# Patient Record
(demographics unavailable — no encounter records)

---

## 2017-07-07 NOTE — RADIOLOGY REPORT (SQ)
EXAM DESCRIPTION:  CHEST SINGLE VIEW



COMPLETED DATE/TIME:  7/7/2017 8:11 pm



REASON FOR STUDY:  sob



COMPARISON:  None.



EXAM PARAMETERS:  NUMBER OF VIEWS: One view.

TECHNIQUE: Single frontal radiographic view of the chest acquired.

RADIATION DOSE: NA

LIMITATIONS: None.



FINDINGS:  LUNGS AND PLEURA: Patchy airspace disease in the right lung base.  No significant effusion
.  No pneumothorax.  Mild interstitial thickening bilaterally.

MEDIASTINUM AND HILAR STRUCTURES: Age-appropriate.

HEART AND VASCULAR STRUCTURES: Mild cardiac enlargement.

BONES: No acute findings.

HARDWARE: None in the chest.

OTHER: No other significant finding.



IMPRESSION:  Patchy airspace disease in the right lung base.  No significant effusion.  Mild intersti
tial thickening bilaterally.



TECHNICAL DOCUMENTATION:  JOB ID:  [id number]

## 2017-07-07 NOTE — ER DOCUMENT REPORT
ED Medical Screen (RME)





- General


Chief Complaint: Shortness Of Breath


Stated Complaint: DIFFICULTY BREATHING


Time Seen by Provider: 07/07/17 19:20


Mode of Arrival: Ambulatory


Information source: Patient


Notes: 


72-year-old man with a history of chronic kidney disease who presents to the 

emergency room with increasing shortness of breath, dyspnea on exertion and 

increasing lower extremity swelling over the last few days.


TRAVEL OUTSIDE OF THE [place] IN LAST 30 DAYS: No





- Related Data


Allergies/Adverse Reactions: 


 





No Known Allergies Allergy (Verified 07/07/17 18:57)


 











Past Medical History





- Social History


Chew tobacco use (# tins/day): No


Frequency of alcohol use: None


Drug Abuse: None





- Past Medical History


Cardiac Medical History: Reports: Hx Hypertension


Renal/ Medical History: Denies: Hx Peritoneal Dialysis


Past Surgical History: Reports: Hx Orthopedic Surgery - lt knee replacement, Hx 

Tonsillectomy





- Immunizations


Hx Diphtheria, Pertussis, Tetanus Vaccination: Yes





Physical Exam





- Vital signs


Vitals: 





 











Temp Pulse Resp BP Pulse Ox


 


 98.2 F   75   20   162/82 H  96 


 


 07/07/17 18:57  07/07/17 18:57  07/07/17 18:57  07/07/17 18:57  07/07/17 18:57














Course





- Vital Signs


Vital signs: 





 











Temp Pulse Resp BP Pulse Ox


 


 98.2 F   75   20   162/82 H  96 


 


 07/07/17 18:57  07/07/17 18:57  07/07/17 18:57  07/07/17 18:57  07/07/17 18:57

## 2017-07-07 NOTE — PDOC H&P
History of Present Illness


Admission Date/PCP: 


  07/07/17 22:11





  CARLOS JACOB, 





Patient complains of: Shortness of breath and leg swelling


History of Present Illness: 


SULMA MURPHY is a 72 year old male with a past medical history of hypertension, 

gout and chronic kidney disease who had been in his usual state of health until 

approximately 4 days prior to presentation with shortness of breath with 

exertion and orthopnea.  He denies recent chest, back or abdominal pain, 

palpitations nausea or vomiting.  He also denies recent change in medications 

and or diet.  In the emergency room he is found to have volume overload with a 

second-degree heart block with bradycardia.  He started on Lasix and referred 

to the hospitalist for admission.  Patient denies recent viral prodrome and is 

otherwise felt well.








Past Medical History


Cardiac Medical History: Reports: Hypertension


Renal/ Medical History: Reports: Chronic Kidney Disease


Musculoskeltal Medical History: Reports: Gout





Past Surgical History


Past Surgical History: Reports: Orthopedic Surgery - lt knee replacement, 

Tonsillectomy





Social History


Information Source: Patient


Lives with: Spouse/Significant other


Smoking Status: Never Smoker


Frequency of Alcohol Use: None


Drugs: None





- Advance Directive


Resuscitation Status: Full Code





Family History


Family History: None


Parental Family History Reviewed: Yes


Children Family History Reviewed: Yes


Sibling(s) Family History Reviewed.: Yes





Medication/Allergy


Home Medications: 








Allopurinol [Zyloprim 300 Mg Tablet] 300 mg PO DAILY 11/25/12 


Bisoprolol Fumarate/Hctz [Ziac 5-6.25 Mg Tablet] 1 each PO DAILY 11/25/12 


Nifedipine [Adalat CC 60 mg Tablet] 60 mg PO DAILY 11/25/12 








Allergies/Adverse Reactions: 


 





No Known Allergies Allergy (Verified 07/07/17 18:57)


 











Review of Systems


Constitutional: ABSENT: chills, fever(s), headache(s), weight gain, weight loss


Eyes: ABSENT: visual disturbances


Ears: ABSENT: hearing changes


Cardiovascular: PRESENT: dyspnea on exertion, edema, orthropnea.  ABSENT: chest 

pain, palpitations


Respiratory: ABSENT: cough, hemoptysis


Gastrointestinal: ABSENT: abdominal pain, constipation, diarrhea, hematemesis, 

hematochezia, nausea, vomiting


Genitourinary: ABSENT: dysuria, hematuria


Musculoskeletal: ABSENT: joint swelling


Integumentary: ABSENT: rash, wounds


Neurological: ABSENT: abnormal gait, abnormal speech, confusion, dizziness, 

focal weakness, syncope


Psychiatric: ABSENT: anxiety, depression, homidical ideation, suicidal ideation


Endocrine: ABSENT: cold intolerance, heat intolerance, polydipsia, polyuria


Hematologic/Lymphatic: ABSENT: easy bleeding, easy bruising





Physical Exam


Vital Signs: 


 











Temp Pulse Resp BP Pulse Ox


 


 98.2 F   75   25 H  173/63 H  96 


 


 07/07/17 18:57  07/07/17 18:57  07/07/17 22:42  07/07/17 22:42  07/07/17 22:42











General appearance: PRESENT: no acute distress, cooperative, well-developed, 

well-nourished


Head exam: PRESENT: atraumatic, normocephalic


Eye exam: PRESENT: conjunctiva pink, EOMI, PERRLA.  ABSENT: scleral icterus


Ear exam: PRESENT: normal external ear exam


Mouth exam: PRESENT: moist, tongue midline


Neck exam: ABSENT: carotid bruit, JVD, lymphadenopathy, thyromegaly


Respiratory exam: PRESENT: crackles, decreased breath sounds, tachypnea.  ABSENT

: rales, rhonchi, wheezes


Cardiovascular exam: PRESENT: bradycardia, irregular rhythm, +S1, +S2.  ABSENT: 

diastolic murmur, rubs, systolic murmur


Pulses: PRESENT: normal dorsalis pedis pul


Vascular exam: PRESENT: normal capillary refill


GI/Abdominal exam: PRESENT: normal bowel sounds, soft.  ABSENT: distended, 

guarding, mass, organolmegaly, rebound, tenderness


Rectal exam: PRESENT: deferred


Extremities exam: PRESENT: full ROM, pedal edema, +2 edema.  ABSENT: calf 

tenderness, clubbing, joint swelling, tenderness


Neurological exam: PRESENT: alert, awake, oriented to person, oriented to place

, oriented to time, oriented to situation, CN II-XII grossly intact.  ABSENT: 

motor sensory deficit


Psychiatric exam: PRESENT: appropriate affect, normal mood.  ABSENT: homicidal 

ideation, suicidal ideation


Skin exam: PRESENT: dry, intact, warm.  ABSENT: cyanosis, rash





Results


Impressions: 


 





Chest X-Ray  07/07/17 19:20


IMPRESSION:  Patchy airspace disease in the right lung base.  No significant 

effusion.  Mild interstitial thickening bilaterally.


 














Assessment & Plan





- Diagnosis


(1) Congestive heart failure


Qualifiers: 


     Congestive heart failure type: unspecified congestive heart failure type


Is this a current diagnosis for this admission?: YesPlan: 


Secondary to dysrhythmia resulting in volume overload, transcutaneous pacemaker 

placed, cardiology consulted, beta-blocker reduced, transdermal nitro and Lasix 

initiated.  2D echo and fluid restriction ordered








(2) Chronic kidney disease





Is this a current diagnosis for this admission?: YesPlan: 


Chronic kidney disease, avoiding nephrotoxic meds and doses holding 

allopurinol.  Reevaluate chemistry








(3) Hypertension





Is this a current diagnosis for this admission?: YesPlan: 


Optimize ACE inhibitor, nitroglycerin and as needed hydralazine.  Norvasc held 

for edema








(4) Second degree AV block, Mobitz type I


Is this a current diagnosis for this admission?: YesPlan: 


Transcutaneous pacing placed not yet indicated, cardiology consult.











- Time


Time Spent: 50 to 70 Minutes





- Inpatient Certification


Medical Necessity: Need Close Monitoring Due to Risk of Patient Decompensation

## 2017-07-08 NOTE — EKG REPORT
SEVERITY:- ABNORMAL ECG -

SINUS BRADYCARDIA

MOBITZ I AV BLOCK (WENCKEBACH)

LEFT ANTERIOR FASCICULAR BLOCK

BORDERLINE T WAVE ABNORMALITIES

:

Confirmed by: Jose Angel Hallman MD 08-Jul-2017 09:13:07

## 2017-07-08 NOTE — EKG REPORT
SEVERITY:- ABNORMAL ECG -

SINUS RHYTHM

NONCONDUCTED PAC

FIRST DEGREE AV BLOCK

LEFT ANTERIOR FASCICULAR BLOCK

:

Confirmed by: Jose Angel Hallman MD 08-Jul-2017 16:37:59

## 2017-07-08 NOTE — PDOC PROGRESS REPORT
Subjective


Progress Note for:: 07/08/17


Subjective:: 


Patient continues to have a heart rate in the 30s and 40s.


Patient denies chest pain, shortness of breath, nausea, vomiting, fever, chills.





Physical Exam


Vital Signs: 


 











Temp Pulse Resp BP Pulse Ox


 


 98.0 F   61   20   141/71 H  95 


 


 07/08/17 03:53  07/08/17 03:53  07/08/17 03:53  07/08/17 03:53  07/08/17 03:53








 Intake & Output











 07/07/17 07/08/17 07/09/17





 06:59 06:59 06:59


 


Intake Total  200 


 


Output Total  1775 


 


Balance  -1575 


 


Weight  84.8 kg 











Exam: 


GENERAL: A+Ox3, NAD


HEENT: Conjunctiva clear, nonicteric, moist mucous membranes, no JVD, midline 

trachea


RESPIRATORY: CTAB


CARDIAC: Bradycardic, regularly irregular, +2/6 SM LLSB, no gallops/rubs


ABDOMEN: Soft, NTTP, ND, +BSx4


EXTREMETIES: No cyanosis, clubbing; 2+ bilateral lower extremity edema


NEUROLOGIC: Alert, oriented to person/place/time, CN's grossly intact, no focal 

deficits


SKIN: No rash, wounds


PSYCH: Normal mood, normal affect





Results


Laboratory Results: 


 











  07/08/17 07/08/17





  01:27 01:27


 


Creatine Kinase  69 


 


CK-MB (CK-2)   1.08


 


Troponin I   0.016











Impressions: 


 





Chest X-Ray  07/07/17 19:20


IMPRESSION:  Patchy airspace disease in the right lung base.  No significant 

effusion.  Mild interstitial thickening bilaterally.


 














Assessment & Plan





- Diagnosis


(1) Second degree AV block, Mobitz type I


Is this a current diagnosis for this admission?: YesPlan: 


Patient currently with second-degree Mobitz block.  Stop patient's Coreg and 

Norvasc.  Continue to monitor.  Consider dopamine for hypotension and 

dobutamine for congestive heart failure.  Appreciate cardiology input.  

Continue to monitor patient on telemetry for fear of worsening to third-degree 

heart block.








(2) Congestive heart failure


Qualifiers: 


     Congestive heart failure type: unspecified congestive heart failure type  

   Congestive heart failure chronicity: acute        Qualified Code(s): I50.9 - 

Heart failure, unspecified  


Is this a current diagnosis for this admission?: YesPlan: 


Patient with acute congestive heart failure.  Continue diuresis.


Unable to tolerate beta-blocker secondary to bradycardia.


Lisinopril 40 mg p.o. daily.


Echo currently pending.


Consider dobutamine if patient persists with bradycardia and heart failure.








(3) Chronic kidney disease


Qualifiers: 


     Chronic kidney disease stage: stage 2 (mild)        Qualified Code(s): 

N18.2 - Chronic kidney disease, stage 2 (mild)  


Is this a current diagnosis for this admission?: YesPlan: 


Adjust medications








(4) Hypertension


Qualifiers: 


     Hypertension type: unspecified        Qualified Code(s): I10 - Essential (

primary) hypertension  


Is this a current diagnosis for this admission?: YesPlan: 


Currently controlled on lisinopril and will consider as needed hydralazine.











- Time


Time Spent with patient: 15-24 minutes


Medications reviewed and adjusted accordingly: Yes


Within: Other - Improvement patient's symptomatology

## 2017-07-08 NOTE — CONSULTATION REPORT E
Consultation Report



NAME: SULMA MURPHY

MRN:  [id number]               : 1944      AGE: 72Y

DATE: 2017     335  A



TO:   ELLIS DELEON M.D.



FROM: LIVIER SHORT M.D.

      Requesting Physician



REASON FOR CONSULTATION:

Bradycardia and second degree AV block.



HISTORY OF PRESENT ILLNESS:

The patient is a 72-year-old  male with past medical history of

hypertension and chronic kidney disease who states that since the past 4

days prior to admission he has been having progressively increasing

shortness of breath with less shortness of breath with PND, orthopnea, and

leg edema.  He also had some chest tightness which is now relieved.  Also

the patient's PND and leg edema is much improved now with treatment.  The

patient was seen in the emergency room and the ER doctor called me saying

that the patient was in complete heart block but on review of the EKG by

me on the computer in [facility], although he had episodes of 2:1 second

degree AV block, it seemed that there were episodes where the patient had

second degree AV block type I Wenckebach.  Also when he had 2:1 AV block,

the QRS complexes were narrow and in view of the patient's other rhythm

showing that the patient was in Wenckebach type I second degree AV block,

it was thought that this 2:1 AV block was also secondary to Wenckebach. 

The patient denies any dizziness or syncope or near syncope.  There are no

TIA or CVA symptoms.  At present the patient has no shortness of breath at

rest and has no PND but does have some orthopnea.  The patient's states

with diuretics he had good diuresis.  The patient's blood pressure has

remained stable even when the patient had second degree AV block.  After I

gave the patient atropine 0.6 mg IV push times 1, the patient's EKG seems

to be showing sinus rhythm with first degree AV block and occasionally

blocked APCs.



PAST MEDICAL HISTORY:

Positive for history of hypertension and history of chronic kidney

disease.  He also has history of glaucoma.  The patient for his blood

pressure has been on Coreg, amlodipine, timolol for his eyedrops, and also

lisinopril.  The patient's Coreg has been discontinued.  He has a history

of diabetes mellitus type 2, non-insulin dependent.  He used to be on

metformin which recently has been discontinued.  He has a history of

chronic kidney disease.  At present seems to be stage II with a GFR of 59

mL/min.  He also has hypokalemia which has been corrected.  He has no

history of TIA or CVA.  No history of MI.  Patient denies past history of

congestive heart failure, only recent leg edema, PND, orthopnea.  The

patient denies any palpitations, dizziness, near syncope, or syncope. 

There is no history of coronary heart disease.  No history of rheumatic

fever.  No history of MI or anginal symptoms.  No history of asthma or

COPD.  No history of sleep apnea.  No history of pulmonary embolism.  No

recent cough or sputum production.  No symptoms of urinary tract

infection.



REVIEW OF SYSTEMS:

CONSTITUTIONAL:  Denies any fever, chills, or rigors.  Patient has some

mild generalized fatigue.



HEAD:  Denies any dizziness or head injury.



EYES:  No history of amblyopia or diplopia.  No history of amaurosis

fugax.



EARS:  No history of hearing loss.  No history of tinnitus.  No history of

vertigo.



NOSE:  No history of deviated nasal septum.  No bleeding from the nose. 

No history of hay fever.  No history of nasal polyposis.



MOUTH:  No history of altered taste sensation.  No history of ulcers in

the mouth.  No history of bleeding from the gums.



THROAT:  No history of odynophagia or dysphagia.  No history of recurrent

sore throats.



SKIN:  No pruritus.  No yellowish discoloration of the skin.  No history

of psoriasis.  No history of skin cancer.



NECK:  No history of painless or painful swelling in the neck.  No neck

pain.  No goiter.



LUNGS:  No history of recent cough or sputum production.  No wheezing.  No

history of asthma or COPD.  No history of sleep apnea.  No history of

pulmonary embolism.  No history of pleuritic chest pain.  No history of

hemoptysis.  No symptoms suggestive of upper or lower respiratory tract

infection of pneumonia.



CARDIAC:  History of hypertension.  At present patient on Coreg.  Doubt

second degree AV block with stable blood pressure and without any symptoms

of dizziness or syncope or near syncope.  His Coreg has been stopped and

now the patient is in sinus rhythm with first degree AV block with

occasional blocked premature atrial complexes.  The patient has no history

of congestive heart failure although recently the patient's symptoms

suggestive of biventricular failure with leg edema, PND, orthopnea.  No

history of MI.  No prior history of anginal symptoms but the patient

states that recently he has been having chest tightness.  This may be due

to the patient being in volume overload congestive heart failure.  It is

not known what the patient's systolic function is and if at all the

patient has diastolic dysfunction, we will need an echo.  There is no

history of dizziness or near syncope or syncope.  History of hypertension

well controlled.  No prior history of MI or anginal symptoms.



MUSCULOSKELETAL:  Denies arthritis or collagen vascular disease.



RENAL:  History of chronic kidney disease.  At present GFR is 59, hence

the patient is stage II.  On admission the patient was stage III.  No

symptoms of UTI.  No symptoms of an enlarged prostate.  No history of

hematuria, pyuria, or dysuria.



GASTROINTESTINAL:  No history of GERD.  No history of peptic ulcer

disease.  No history of GI bleed.  No history of fatty food intolerance. 

No history of abdominal pain.  No history of jaundice.  No history of

cirrhosis.  No history of altered bowel movements.



CENTRAL NERVOUS SYSTEM:  No history of TIA or CVA.  No history of

seizures, headaches, or migraines.  No history gait imbalance.  No history

of sleep apnea.



PSYCHIATRIC:  No history of anxiety or depression.  No suicidal or

homicidal ideation.



VASCULAR:  No history of calf or buttock claudication.  No history of DVT.



HEMATOLOGICAL:  No history of bleeding diathesis.  No history of clotting

disorders.



ALLERGIES:

The patient has no known allergies.



SOCIAL HISTORY:

The patient does not smoke.  There is no history of ETOH abuse.



DISPOSITION:

The patient is a FULL [employer].  His wife is the surrogate healthcare decision

maker.



MEDICATIONS:

1.  Aspirin 81 mg p.o. daily.

2.  Note that the patient had one dose of atropine 0.6 mg times 1.

3.  Colace 100 mg p.o. daily.

4.  He is on Vasotec 10 mg p.o. q.12 hours.

5.  He did get Lasix 40 mg IV push times 1 yesterday and he is on 40 mg IV

daily.

6.  He is on heparin 5000 units subcutaneously q.8 hours for DVT/PE

prophylaxis.

7.  He is on nitroglycerin, Nitro-Dur transdermal times 1 which has been

discontinued.

8.  He did get KCL 30 mEq p.o. times 1 for his potassium being low.



PHYSICAL EXAMINATION:

On examination, the patient was seen around 9 a.m.



VITAL SIGNS:  His pulse was 60 beats per minute.  The patient is afebrile

with a temperature of 98.2 degrees Fahrenheit.  His blood pressure is

138/72.  Respirations are 16 per minute.  O2 saturations are 96% on room

air.



HEAD:  Atraumatic, normocephalic.



EYES:  Pupils are equal, round, regular and reactive to light and

accommodation.  Extraocular movements are normal.  There is no

conjunctival pallor.  There is no scleral icterus.



EARS:  Tympanic membranes are intact.  External auditory canals are clear.



NOSE:  There is no deviated nasal septum.  There is no inflammation of the

nasal mucous membranes.



THROAT:  Mucous membranes of the throat are without any exudates or

redness.



SKIN:  There are no skin rashes.  There is no petechia or ecchymosis. 

There are no skin lesions.



NECK:  Supple.  There is mild JVD present.  Carotids are equal.  There is

no bruit.  There is no goiter.  There is no lymphadenopathy.  There are

axillary muscles of respiration in use.  Trachea is central.



LUNGS:  Show a few bibasilar rales of CHF.  There is no chest wall

tenderness.



HEART:  S1 and S2 are heard.  There is no S3 gallop.  There is no S4

gallop.  There is a systolic murmur in the left sternal border and the

apex.  There are no rubs.



ABDOMEN:  Soft, nontender.  There is no hepatosplenomegaly.  Bowel sounds

are well heard.  There are no tender areas or masses.



EXTREMITIES:  Femorals are slightly diminished.  There are no femoral

bruits.  Leg pulses are well felt.  There is trace to mild pedal edema

bilaterally.  There is no cyanosis or clubbing.  There is no DVT or

cellulitis.  There is no calf tenderness.



CENTRAL NERVOUS SYSTEM:  The patient is conscious, awake, alert, oriented

x3 with no focal deficits.



PSYCHIATRIC:  The patient's judgment and insight are intact.  His affect

is normal.



DIAGNOSTICS:

The patient's chest x-ray shows patchy airspace disease in the right lung

base.  No significant effusion.  Mild interstitial thickening bilaterally.

There is mild cardiac enlargement but no definite evidence of congestive

heart failure.



The patient's EKG done yesterday shows a heart rate of 39 beats per minute

which is second degree AV block, Mobitz type I.  Left anterior fascicular

block.  Poor R wave in V1-V3 most likely secondary to left anterior

fascicular block.  The patient's EKG done this morning shows sinus

bradycardia, Mobitz type 1 AV block, left anterior fascicular block,

borderline T wave abnormalities and also blocked APCs.  His EKG subsequent

to giving atropine shows sinus bradycardia with first degree AV block and

occasional blocked APCs.



The patient's sodium is 144.5, potassium 3.4, chloride is 109, CO2 is 21. 

The patient's BUN is 17, creatinine is 1.42, GFR is reduced at 59 mL.  His

glucose is 116 and his calcium is 8.8.  His cardiac enzymes have been

negative x3 including the CPK-MB and the troponin I. his NT-proBNP is

2650.  His triglycerides are 73.  His total cholesterol is 134.56.  His

LDL cholesterol is good at 81.  His HDL cholesterol is low at 36.  His

thyroid function showed a normal TSH of 2.23.  Free T4 is 1.37.  Free T3

is 4.22.  The patient's white count is 9000, hemoglobin is 12.2,

hematocrit is 38.5, and his platelet count is 190,000.



IMPRESSION:

1.  Second degree AV block, Mobitz type I with a stable blood pressure

and asymptomatic.

2.  Volume overload congestive heart failure, most likely secondary to his

renal function deteriorating.  Cannot at present conclude if the patient

has systolic or diastolic or a combination of systolic and diastolic heart

failure.  Will need an echocardiogram.  Note that the patient's Coreg has

been held.  He is on Vasotec.  Continue the same.

3.  Hypertension seems to be fairly well controlled.

4.  Diabetes mellitus type 2, non-insulin dependent.

5.  Dyslipidemia.

6.  Chronic kidney disease.  At present stage II.  On admission it was

stage III.  Continue the patient's Lasix.  Continue his other medications.

Will hold the patient's beta blocker.  We will get an echocardiogram. 

Later would recommend the patient have an IV Lexiscan Cardiolite stress

test.



Note, the patient was seen at 9 a.m.  Forty minutes spent on the patient

with more than 50% of the time spent on direct patient care.  His

medications have been reviewed.  Medications have been adjusted and

stopped.  Medications added.  Note this required a highly complex medical

decision making.  Discussed with other caregiving providers on the case. 

We will follow with you.  Coordination of care done and management plan

formulated for this patient after discussions with the other caregiving

providers on the case.  We will follow up with you.  Thanking you.

 







DICTATING PHYSICIAN: ELLIS DELEON M.D.





[id number]                  DT: 2017    1317

PHY#: [id number]            DD: 2017    1304

ID:   [id number]           JOB#: [id number]      ACCT: [id number]



cc:ELLIS DELEON M.D.

>







MTDD

## 2017-07-08 NOTE — EKG REPORT
SEVERITY:- ABNORMAL ECG -

SINUS BRADYCARDIA

SECOND  DEGREE AV BLOCK  MOBITZ I.

LEFT ANTERIOR FASCICULAR BLOCK

OLD ANTERIOR MI

:

Confirmed by: Jose Angel Hallman MD 08-Jul-2017 09:14:39

## 2017-07-09 NOTE — EKG REPORT
SEVERITY:- ABNORMAL ECG -

PREDOMINANT 2:1 AV BLOCK

MOBITZ I AV BLOCK (WENCKEBACH)

LEFT ANTERIOR FASCICULAR BLOCK

:

Confirmed by: Jose Angel Hallman MD 09-Jul-2017 18:03:06

## 2017-07-09 NOTE — PDOC PROGRESS REPORT
Subjective


Progress Note for:: 07/09/17


Subjective:: 


Patient continues to have a heart rate in the 30s and 40s.


Patient denies chest pain, shortness of breath, nausea, vomiting, fever, chills

, constipation, diarrhea. 





Physical Exam


Vital Signs: 


 











Temp Pulse Resp BP Pulse Ox


 


 98.2 F   62   18   151/79 H  98 


 


 07/09/17 12:16  07/09/17 12:16  07/09/17 12:16  07/09/17 12:16  07/09/17 12:16








 Intake & Output











 07/08/17 07/09/17 07/10/17





 06:59 06:59 06:59


 


Intake Total 200 1038 


 


Output Total 1778 2720 


 


Balance -1575 -1530 


 


Weight 84.8 kg 84.3 kg 











Exam: 


GENERAL: A+Ox3, NAD


HEENT: Conjunctiva clear, nonicteric, moist mucous membranes, no JVD, midline 

trachea


RESPIRATORY: CTAB


CARDIAC: Bradycardic, regularly irregular, +2/6 SM LLSB, no gallops/rubs


ABDOMEN: Soft, NTTP, ND, +BSx4


EXTREMETIES: No cyanosis, clubbing; 1+ bilateral lower extremity edema


NEUROLOGIC: Alert, oriented to person/place/time, CN's grossly intact, no focal 

deficits


SKIN: No rash, wounds


PSYCH: Normal mood, normal affect





Results


Laboratory Results: 


 





 07/09/17 08:10 





 07/09/17 08:10 





 











  07/09/17 07/09/17





  08:10 08:10


 


WBC  9.9 


 


RBC  4.92 


 


Hgb  12.7 L 


 


Hct  40.3 


 


MCV  82 


 


MCH  25.7 L 


 


MCHC  31.4 L 


 


RDW  17.5 H 


 


Plt Count  187 


 


Seg Neutrophils %  67.9 


 


Lymphocytes %  16.4 


 


Monocytes %  12.2 


 


Eosinophils %  2.6 


 


Basophils %  0.9 


 


Absolute Neutrophils  6.7 


 


Absolute Lymphocytes  1.6 


 


Absolute Monocytes  1.2 


 


Absolute Eosinophils  0.3 


 


Absolute Basophils  0.1 


 


Sodium   143.0


 


Potassium   3.7


 


Chloride   108 H


 


Carbon Dioxide   23


 


Anion Gap   12


 


BUN   19


 


Creatinine   1.50 H


 


Est GFR ( Amer)   56 L


 


Est GFR (Non-Af Amer)   46 L


 


Glucose   126 H


 


Calcium   8.7








 











  07/08/17 07/08/17 07/08/17





  01:27 01:27 07:50


 


Creatine Kinase  69   55


 


CK-MB (CK-2)   1.08 


 


Troponin I   0.016 














  07/08/17 07/08/17 07/08/17





  07:50 14:12 14:12


 


Creatine Kinase   52 L 


 


CK-MB (CK-2)  0.72   0.73


 


Troponin I  0.017   0.013











Impressions: 


 





Chest X-Ray  07/07/17 19:20


IMPRESSION:  Patchy airspace disease in the right lung base.  No significant 

effusion.  Mild interstitial thickening bilaterally.


 














Assessment & Plan





- Diagnosis


(1) Second degree AV block, Mobitz type I


Is this a current diagnosis for this admission?: YesPlan: 





Patient currently with second-degree Mobitz block.  Continue to hold patient's 

Coreg and Norvasc.  Have also held patient's combigen and timolol eye ggt. 

Continue to monitor.  Consider dopamine for hypotension and dobutamine for 

congestive heart failure.  Appreciate cardiology input and administration of 

atropine. Continue to monitor patient on telemetry for fear of worsening to 

third-degree heart block.








(2) Congestive heart failure


Qualifiers: 


     Congestive heart failure type: unspecified congestive heart failure type  

   Congestive heart failure chronicity: acute        Qualified Code(s): I50.9 - 

Heart failure, unspecified  


Is this a current diagnosis for this admission?: YesPlan: 





Uncompensated.


Patient with acute congestive heart failure.  Continue diuresis.


Unable to tolerate beta-blocker secondary to bradycardia.


Lisinopril 40 mg p.o. daily.


Echo currently pending.


Consider dobutamine if patient persists with bradycardia and heart failure.








(3) Chronic kidney disease


Qualifiers: 


     Chronic kidney disease stage: stage 2 (mild)        Qualified Code(s): 

N18.2 - Chronic kidney disease, stage 2 (mild)  


Is this a current diagnosis for this admission?: YesPlan: 


Adjust medications








(4) Hypertension


Qualifiers: 


     Hypertension type: unspecified        Qualified Code(s): I10 - Essential (

primary) hypertension  


Is this a current diagnosis for this admission?: YesPlan: 


Currently controlled on lisinopril and will consider as needed hydralazine.











- Time


Time Spent with patient: 25-34 minutes


Medications reviewed and adjusted accordingly: Yes





- Inpatient Certification


Based on my medical assessment, after consideration of the patient's 

comorbidities, presenting symptoms, or acuity I expect that the services needed 

warrant INPATIENT care.: Yes


I certify that my determination is in accordance with my understanding of 

Medicare's requirements for reasonable and necessary INPATIENT services [42 CFR 

412.3e].: Yes


Medical Necessity: Need For Continuous Telemetry Monitoring


Post Hospital Care: D/C Planner Documentation

## 2017-07-09 NOTE — PROGRESS NOTE E
Progress Note



NAME: SULMA MURPHY

MRN: [id number]

: 1944             AGE: 72Y

DATE:  2017                     ROOM: 335



SUBJECTIVE:

The patient denies any chest pain or discomfort.  There is no PND or

orthopnea.  There is no leg edema.  The patient denies any anginal

symptoms.  There is no dizziness, near syncope or syncope.  The patient

continues to be in Mobitz type 1 Wenckebach second degree AV block.  The

patient was given atropine 1 mg IV push and subsequently went to

first-degree AV block with a heart rate of 62 beats per minute.  Even when

the patient was 2:1 block Wenckebach type 1, his blood pressure was stable

and patient was asymptomatic.  There are no TIA or CVA symptoms.



OBJECTIVE:

GENERAL:  On examination, the patient is well built and well nourished in

no acute distress.



VITAL SIGNS:  He is afebrile with a temperature of 98.2 degrees

Fahrenheit, pulse is 62 per minute after atropine, blood pressure is

151/79.  Respirations are 18 per minute.  Oxygen saturations are 98% on

room air.



HEAD:  Atraumatic, normocephalic.



EYES:  Pupils are equal, round, regular and reactive to light and

accommodation.  Extraocular movements are normal.  There is no

conjunctival pallor.  There is no scleral icterus.



EARS:  Tympanic membranes are intact.  External auditory canals are clear.



NOSE:  There is no deviated nasal septum.  There is no inflammation of the

nasal mucous membranes.



MOUTH:  Mucous membranes of the mouth are moist.  Tongue is moist.  There

are no ulcers.



THROAT:  There are no exudates in oropharynx.  There is no redness of the

oropharynx.



SKIN:  There are no skin rashes.  There is no petechia or ecchymosis. 

There are no skin lesions.



NECK:  Supple.  There is no JVD.  Carotids are equal.  There is no bruit. 

There is no goiter.  There is no lymphadenopathy.  There are no axillary

muscles of respiration in use.  Trachea is central.



LUNGS:  Clear to auscultation and percussion.  There is no chest wall

tenderness.



HEART:  S1 and S2 are heard.  There is no S3 gallop.  There is no S4

gallop.  There is a systolic murmur in the left sternal border and the

apex.  There are no rub.



ABDOMEN:  Soft, nontender.  There is no hepatosplenomegaly.  Bowel sounds

are well heard.  There are no tender areas or masses.



EXTREMITIES:  Femorals are slightly diminished.  There are no femoral

bruits.  Leg pulses are well felt.  There is no pedal edema.  There is no

cyanosis or clubbing.  There is no DVT or cellulitis.  There is no calf

tenderness.



CENTRAL NERVOUS SYSTEM:  The patient is conscious, awake, alert, oriented

x3 with no focal deficits.



PSYCHIATRIC:  The patient's judgment and insight are intact.  His affect

is normal.



DIAGNOSTICS:

The patient's EKG done this morning shows 2:1 AV block, Mobitz type 1 AV

block, Wenckebach, left anterior fascicular block, sinus rhythm. 

Subsequent to atropine, the patient's monitor strip shows first-degree AV

block with no evidence of second degree AV block.  Note that the patient

took his last Coreg dose was taken the evening of 2017.  Also the

patient's Timoptic is on board and, hence, will stop this.



The patient's white count is 9900, hemoglobin is 12.7, hematocrit is 40.3,

platelet count is 187,000.  The patient's sodium is 143.0, potassium 3.7,

chloride is 108, CO2 is 23.  The patient's BUN is 19, creatinine is 1.50,

GFR is reduced at 56, which is chronic kidney disease stage 3A.  His

glucose is 126 and calcium is 8.7.



IMPRESSION AND PLAN:

1.  SECOND DEGREE AV BLOCK, MOBITZ TYPE 1, WITH STABLE BLOOD PRESSURE AND

ASYMPTOMATIC.

2.  VOLUME OVERLOAD CONGESTIVE HEART FAILURE, MOST LIKELY SECONDARY TO HIS

RENAL FUNCTION DETERIORATION.  At present no evidence of heart failure. 

The patient appears to be stable.  It is not know whether the patient has

systolic or diastolic component to the heart failure.  Will need an

echocardiogram.  Echo has been ordered and will be done in the a.m.

3.  HYPERTENSION SEEMS TO BE WELL CONTROLLED.

4.  DIABETES MELLITUS TYPE 2, NON-INSULIN DEPENDENT WITH CHRONIC KIDNEY

DISEASE.

5.  DYSLIPIDEMIA.

6.  CHRONIC KIDNEY DISEASE.  At present stage 3.  Continue the patient's

Lasix.  Continue other medications and the patient's Coreg has been

stopped.  We will also hold the patient's Timoptic since this can also

cause bradycardia.



Note, the patient was seen for 30 minutes, with more than 50% of the time

spent on direct patient care.  The patient's medications have been

reviewed and medications are altered as per discussions with the attending

physician.  Also discussed with the attending physician.  At present there

is no need for permanent pacemaker.  We will see how the patient behaves

once all the beta-blockers have been held, both the topical eye solution

and p.o. Coreg.  Note:  Continues to have highly complex medical decision

making needed in this case.  We will follow with you.  Discussed with the

attending on the case and formulated a management plan for this patient. 

Thanking you.





DICTATING PHYSICIAN:  ELLIS DELEON M.D.





[id number]                  DT: 2017    1549

PHY#: [id number]            DD: 2017    1258

ID:   [id number]           JOB#: [id number]       ACCT: [id number]



cc:

>

## 2017-07-10 NOTE — XCELERA REPORT
[address] [address]

                             Tel: [phone]

                             Fax: [phone]



                    Transthoracic Echocardiogram Report

____________________________________________________________________________



Name: SULMA MURPHY

MRN: [id number]                Age: 72 yrs

Gender: Male                   : 1944

Patient Status: Inpatient      Patient Location: 3S\S\335\S\A

Account #: [id number]

Study Date: 07/10/2017 09:55 AM

Accession #: [id number]

____________________________________________________________________________



Height: 72 in        Weight: 185 lb        BSA: 2.1 m2



____________________________________________________________________________

Procedure: A two-dimensional transthoracic echocardiogram with color flow

and Doppler was performed. Study Quality: Good.

Reason For Study: MURMUR / CHF



History: MURMUR / CHF.

Ordering Physician: MARYANN DELEON

Performed By: Ibeth Dorado

____________________________________________________________________________





Interpretation Summary

The left ventricle is normal in size.

There is normal left ventricular wall thickness.

LV EF is > than 65%

Left ventricular systolic function is normal.

The left ventricular wall motion is normal.

There is no thrombus.

The right ventricle is grossly normal size.

The right ventricle is not well visualized secondary to technical

limitations

The right atrium is normal.

The left atrial size is normal.

The interatrial septum is intact with no evidence for an atrial septal

defect.

There is no evidence of mitral valve prolapse.

There is no vegetation seen on the mitral valve.

There is no mitral valve stenosis.

There is a moderate amount of mitral regurgitation

There is no aortic valvular vegetation.

There is no aortic valve stenosis

There is no LVOT obstruction.

There is a trace amount of aortic regurgitation

There is no tricuspid stenosis.

There is a mild amount of tricuspid regurgitation

There is mild pulmonary hypertension by echo

RVSP is 37 to 42 mm of Hg ,with RA mean of 5 to 10.

The aortic root is normal size.

The inferior vena cava appeared normal and decreased > 50% with respiration

(RAP 5-10 mmHg)

There is no pericardial effusion.



____________________________________________________________________________



MMode/2D Measurements \T\ Calculations

RVDd: 3.4 cm         LVIDd: 5.3 cm      FS: 41.3 %          MV Diam: 2.3 cm

IVSd: 1.00 cm        LVIDs: 3.1 cm      EDV(Teich): 136.1 ml

                     LVPWd: 1.0 cm      ESV(Teich): 38.4 ml

                                        EF(Teich): 71.8 %



        _____________________________________________________________

Ao root diam: 3.5 cm LVOT diam: 2.2 cm

Ao root area: 9.5 [id number] area: 3.8 cm2

LA dimension: 3.6 cm





Doppler Measurements \T\ Calculations

MV E max steven:     MV area (1 diam):  MV P1/2t max steven:    Ao V2 max:

71.7 cm/sec       4.2 cm2            71.7 cm/sec          169.9 cm/sec

MV A max steven:     MV Flow area       MV P1/2t: 74.7 msec  Ao max P.8 cm/sec       (1diam): 4.2 cm2   MVA(P1/2t): 2.9 cm2  11.5 mmHg

MV E/A: 0.85                         MV dec slope:        OG(V,D): 3.6 cm2

                                     281.4 cm/sec2



        _____________________________________________________________

LV V1 max PG:     MR max steven:        PA V2 max:           TR max steven:

10.4 mmHg         584.9 cm/sec       114.0 cm/sec         279.9 cm/sec

LV V1 max:        MR max PG:         PA max P.2 mmHg  TR max P.4 cm/sec      136.8 mmHg                              31.3 mmHg

LV dP/dt:

909.0 mmHg/s



____________________________________________________________________________

Left Ventricle

The left ventricle is normal in size. There is normal left ventricular wall

thickness. LV EF is > than 65%. Left ventricular systolic function is

normal. Doppler measurements suggest impaired left ventricular relaxation,

which is associated with grade I/IV or mild diastolic dysfunction. The left

ventricular wall motion is normal. There is no thrombus.



Right Ventricle

The right ventricle is grossly normal size. The right ventricle is not well

visualized secondary to technical limitations.



Atria

The right atrium is normal. The left atrial size is normal. The interatrial

septum is intact with no evidence for an atrial septal defect.





Mitral Valve

There is mild mitral annular calcification. There is no evidence of mitral

valve prolapse. There is no vegetation seen on the mitral valve. There is

no mitral valve stenosis. There is a moderate amount of mitral

regurgitation.



Aortic Valve

There is no aortic valvular vegetation. There is no aortic valve stenosis.

There is no LVOT obstruction. There is a trace amount of aortic

regurgitation.



Tricuspid Valve

There is no tricuspid stenosis. There is a mild amount of tricuspid

regurgitation. There is mild pulmonary hypertension by echo. RVSP is 37 to

42 mm of Hg ,with RA mean of 5 to 10.



Pulmonic Valve

There is no pulmonic valvular stenosis. There is no pulmonic valvular

regurgitation.



Great Vessels

The aortic root is normal size. The inferior vena cava appeared normal and

decreased > 50% with respiration (RAP 5-10 mmHg).





Effusions

There is no pericardial effusion.



____________________________________________________________________________



Electronically signed by:      Maryann Deleon      on 07/10/2017 12:23

PM



CC: MARYANN DELEON

>

Maryann Deleon

## 2017-07-10 NOTE — PROGRESS NOTE E
Progress Note



NAME: SULMA MURPHY

MRN: [id number]

: 1944             AGE: 72Y

DATE:  07/10/2017                     ROOM: 335



SUBJECTIVE:

The patient denies any chest pain or discomfort.  There is no PND or

orthopnea.  There is no leg edema.  The patient denies any anginal

symptoms.  There is no dizziness, near syncope or syncope.  The patient

continues to be in Mobitz type 1 Wenckebach second degree AV block, with

the heart rate being in the 30s to low 40s; but, when he ambulates or

exerts himself, it increased with activity and the heart rate goes into

the 60s.  His blood pressure is stable and the patient is asymptomatic. 

There is no TIA or CVA symptoms.



OBJECTIVE:

GENERAL:  On examination, the patient is well built and well nourished, in

no acute distress.



VITAL SIGNS:  He is afebrile with a temperature of 98.2 degrees

Fahrenheit, his respirations are 18 per minute, 02 sats are 98% on room

air.  His heart rate is 43 beats per minute.  Monitor shows Mobitz type 1

Wenckebach second degree AV block.  His blood pressure is 153/61.



HEAD:  Atraumatic, normocephalic.



EYES:  Pupils are equal, round, regular and reactive to light and

accommodation.  Extraocular movements are normal.  There is no

conjunctival pallor.  There is no scleral icterus.



ENT:  Negative.



NECK:  Supple.  There is no JVD.  Carotids are equal.  There is no bruit. 

There is no goiter.  Trachea is central.  There is no lymphadenopathy. 

There are no accessory muscles of respiration in use.



LUNGS:  Clear to auscultation and percussion.  There is no chest wall

tenderness.



HEART:  S1 and S2 are heard.  There is no S3 gallop.  There is no S4

gallop.  There is a systolic murmur in the left sternal border and the

apex.  There are no rub.



ABDOMEN:  Soft, nontender.  There is no hepatosplenomegaly.  Bowel sounds

are well heard.  There are no tender areas or masses.



EXTREMITIES:  Femorals are slightly diminished.  There are no femoral

bruits.  Leg pulses are well felt.  There is no pedal edema.  There is no

cyanosis or clubbing.  There is no DVT or cellulitis.  There is no calf

tenderness.



CENTRAL NERVOUS SYSTEM:  The patient is conscious, awake, alert, oriented

x3, with no focal deficits.



PSYCHIATRIC:  The patient's judgment and insight are intact.  His affect

is normal.



DIAGNOSTICS:

The patient's EKG shows second degree AV block type 1 Wenckebach left

anterior fascicular block.



The patient's echocardiogram shows the left ventricle is normal in size. 

There is normal LVH.  The LV ejection fraction is greater than 65%.  The

left ventricular systolic function is normal.  The left ventricular wall

motion is normal.  There is no thrombus.  The right ventricular size is

grossly normal in size.  There is LV diastolic dysfunction by Doppler

measurements, which is mild, grade 1.  There is mitral annular

calcification.  There is no evidence of mitral stenosis.  There is no

vegetation seen on the mitral valve.  There is no mitral valve stenosis. 

There is moderate amount of mitral regurgitation.  There is no mitral

valve prolapse.  The aortic valve is without any aortic stenosis.  There

is trace aortic regurgitation present.  There is a mild amount of

tricuspid stenosis.  There is mild pulmonary hypertension by echo.  The

right ventricular systolic pressure is 37 mmHg to 42 mmHg with RA mean of

5-10.  There is no pericardial effusion.  Echo discussed with the patient.



IMPRESSION:

1.  SECOND DEGREE AV BLOCK, MOBITZ TYPE 1, WITH STABLE BLOOD PRESSURE AND

ASYMPTOMATIC.  I would recommend that the patient have an exercise

treadmill Cardiolite stress test to see if there is any chronotropic

incompetence or if the heart rate coming up the patient's AV block

dissipates or resolves.   Also, will get a Cardiolite if 85% of the

patient's maximum pressure heart rate is achieved to make sure the patient

has no coronary artery disease.

2.  CONGESTIVE HEART FAILURE, AT PRESENT COMPENSATED.  This is most likely

diastolic congestive heart failure secondary to volume overload due to

chronic kidney disease.

3.  HYPERTENSION.  Blood pressure is borderline controlled.

4.  DIABETES MELLITUS TYPE 2, NON-INSULIN DEPENDENT, WITH CHRONIC KIDNEY

DISEASE.

5.  DYSLIPIDEMIA.

6.  CHRONIC KIDNEY DISEASE.



RECOMMENDATIONS:

Continue current treatment.  As mentioned earlier, will schedule the

patient for exercise Cardiolite stress test in the a.m. to look for

presence of chronotropic incompetence, if any, and also to look for

ischemia/coronary artery disease extent, if any.



NOTE:

Thirty-five minutes spent on this patient with more than 50% of the time

spent on direct patient care.  Medications have been reviewed and

discussed with other caregiving providers on the case and formulating a

management plan for the patient.  This involved highly complex medical

decision making in view of the need for stress test and need to prove that

the patient has no chronotropic incompetence.  If the patient does have

chronotropic incompetence, then would recommend that the patient have a

permanent pacemaker.  This has been discussed with the patient.  Thanking

you.  Will follow with you.





DICTATING PHYSICIAN:  ELLIS DELEON M.D.





[id number]                  DT: 07/10/2017    1450

PHY#: [id number]            DD: 07/10/2017    1430

ID:   [id number]           JOB#: [id number]       ACCT: [id number]



cc:

>

## 2017-07-10 NOTE — PDOC PROGRESS REPORT
Subjective


Progress Note for:: 07/10/17


Subjective:: 


The patient states to feel well.  He denies any syncopal episodes.


His heart rate is still low but increase is with exertion.  He denies any chest 

pain or shortness of breath





Physical Exam


Vital Signs: 


 











Temp Pulse Resp BP Pulse Ox


 


 98.2 F   39 L  18   153/61 H  98 


 


 07/10/17 08:35  07/10/17 08:35  07/10/17 08:35  07/10/17 08:35  07/10/17 08:56








 Intake & Output











 07/09/17 07/10/17 07/11/17





 06:59 06:59 06:59


 


Intake Total 1038 820 


 


Output Total 2575 950 


 


Balance -1537 -130 


 


Weight 84.3 kg 83.2 kg 











General appearance: PRESENT: no acute distress


Head exam: PRESENT: atraumatic


Eye exam: PRESENT: conjunctiva pink


Neck exam: ABSENT: carotid bruit


Respiratory exam: PRESENT: clear to auscultation venessa


Cardiovascular exam: PRESENT: bradycardia


Pulses: PRESENT: normal carotid pulses


Vascular exam: PRESENT: normal capillary refill


GI/Abdominal exam: PRESENT: normal bowel sounds, soft


Extremities exam: PRESENT: full ROM.  ABSENT: tenderness


Musculoskeletal exam: PRESENT: full ROM


Neurological exam: PRESENT: alert, awake





Results


Laboratory Results: 


 





 07/09/17 08:10 





 07/09/17 08:10 





 











  07/08/17 07/08/17 07/08/17





  01:27 01:27 07:50


 


Creatine Kinase  69   55


 


CK-MB (CK-2)   1.08 


 


Troponin I   0.016 














  07/08/17 07/08/17 07/08/17





  07:50 14:12 14:12


 


Creatine Kinase   52 L 


 


CK-MB (CK-2)  0.72   0.73


 


Troponin I  0.017   0.013











Impressions: 


 





Chest X-Ray  07/07/17 19:20


IMPRESSION:  Patchy airspace disease in the right lung base.  No significant 

effusion.  Mild interstitial thickening bilaterally.


 














Assessment & Plan





- Diagnosis


(1) Second degree AV block, Mobitz type I


Is this a current diagnosis for this admission?: YesPlan: 


Heart rate does increase with exertion and ambulation.  Will continue holding 

beta blockers








(2) Chronic kidney disease


Qualifiers: 


     Chronic kidney disease stage: stage 2 (mild)        Qualified Code(s): 

N18.2 - Chronic kidney disease, stage 2 (mild)  


Is this a current diagnosis for this admission?: YesPlan: 


We will reconsult the nephrology.  We will continue with diuretics.  Will add 

Flomax








(3) Urinary retention due to benign prostatic hyperplasia


Is this a current diagnosis for this admission?: YesPlan: 


We will start Flomax








(4) Pulmonary edema


Qualifiers: 


     Chronicity: acute     Qualified Code(s): J81.0 - Acute pulmonary edema  


Is this a current diagnosis for this admission?: YesPlan: 


Continue diuretics and check BMP








(5) Congestive heart failure


Qualifiers: 


     Congestive heart failure type: unspecified congestive heart failure type  

   Congestive heart failure chronicity: acute        Qualified Code(s): I50.9 - 

Heart failure, unspecified  


Is this a current diagnosis for this admission?: YesPlan: 


We will continue with diuretics

## 2017-07-10 NOTE — EKG REPORT
SEVERITY:- ABNORMAL ECG -

SINUS BRADYCARDIA

SECOND  DEGREE AV BLOCK   2:1 CONDUCTION

LAD, CONSIDER LEFT ANTERIOR FASCICULAR BLOCK

BORDERLINE T ABNORMALITIES, INFERIOR LEADS

:

Confirmed by: Jose Angel Hallman MD 10-Jul-2017 08:17:38

## 2017-07-10 NOTE — PDOC CONSULTATION
Consultation


Consult Date: 07/10/17


Consult reason:: CKD 3





History of Present Illness


Admission Date/PCP: 


  07/07/17 22:11





  CARLOS JACOB, 





History of Present Illness: 


SULMA MURPHY is a 72 year old male with a past medical history of hypertension, 

gout and chronic kidney disease 2 with base creatinine around 1.5  who had been 

in his usual state of health until approximately 4 days prior to presentation 

with shortness of breath with exertion and orthopnea and progressive pedal 

edema.  He denies recent chest, back or abdominal pain, palpitations nausea or 

vomiting.  No h/o pre syncope. No h/o fever or chills.He also denies recent 

change in medications and or diet.  In the emergency room he is found to have 

volume overload with a second-degree heart block with bradycardia. 





he was begun on Lasix and currently he is lots better as regards to his 

congestive heart failure.  Has been diagnosed with Mobitz type II block and is 

being evaluated by Dr. Suarez/cardiology.  Labs were reviewed which show 

stable renal numbers.








Past Medical History


Cardiac Medical History: Reports: Hypertension-primary


Renal/ Medical History: Reports: Chronic Kidney Disease Stage III


Musculoskeltal Medical History: Reports: Gout





Past Surgical History


Past Surgical History: Reports: Orthopedic Surgery - lt knee replacement, 

Tonsillectomy





Social History


Lives with: Spouse/Significant other


Smoking Status: Never Smoker


Frequency of Alcohol Use: None


Hx Recreational Drug Use: No


Drugs: None


Hx Prescription Drug Abuse: No





- Advance Directive


Resuscitation Status: Full Code





Family History


Parental Family History Reviewed: Yes - negative for ckd


Children Family History Reviewed: No


Sibling(s) Family History Reviewed.: No





Medication/Allergy


Home Medications: 








Allopurinol [Zyloprim 300 mg Tablet] 300 mg PO QPM 07/08/17 


Amlodipine Besylate [Norvasc 5 mg Tablet] 5 mg PO BID 07/08/17 


Aspirin [Adult Low Dose Aspirin EC] 81 mg PO DAILY 07/08/17 


Atorvastatin Calcium [Lipitor 10 mg Tablet] 10 mg PO DAILY 07/08/17 


Brimonidine Tartrate/Timolol [Combigan 0.2%-0.5% Eye Drops] 1 drop OS BID 07/08/ 17 


Dorzolamide HCl [Trusopt Plus 2% Oph Soln 10 ml] 1 drop OS TID 07/08/17 


Latanoprost [Xalatan 0.005% Oph Soln 2.5 ml] 1 drop OS QHS 07/08/17 


Lisinopril [Prinivil 40 mg Tablet] 40 mg PO DAILY 07/08/17 








Allergies/Adverse Reactions: 


 





No Known Allergies Allergy (Verified 07/07/17 18:57)


 











Review of Systems


Review of Systems: 


Constitutional: [PRESENT: as per HPI.  ABSENT: chills, fever(s), headache(s), 

weight gain, weight loss]


Eyes: [ABSENT: visual disturbances]


Ears: [ABSENT: hearing changes]


Cardiovascular: [ABSENT: chest pain,  palpitations]


Respiratory: [ABSENT: cough, hemoptysis]


Gastrointestinal: [ABSENT: abdominal pain, constipation, diarrhea, hematemesis, 

hematochezia, nausea, vomiting]


Genitourinary: [ABSENT: dysuria, hematuria]


Musculoskeletal: [ABSENT: joint swelling]


Integumentary: [ABSENT: rash, wounds]


Neurological: [ABSENT: abnormal gait, abnormal speech, confusion, dizziness, 

focal weakness, syncope]


Psychiatric: [ABSENT: anxiety, depression, homicidal ideation, suicidal ideation

]


Endocrine: [ABSENT: cold intolerance, heat intolerance,, polydipsia, polyuria]


Hematologic/Lymphatic: [ABSENT: easy bleeding, easy bruising, lymphadenopathy]








Physical Exam


Vital Signs: 


 











Temp Pulse Resp BP Pulse Ox


 


 97.8 F   29 L  17   155/56 H  97 


 


 07/10/17 16:00  07/10/17 16:00  07/10/17 16:00  07/10/17 16:00  07/10/17 16:00








 Intake & Output











 07/09/17 07/10/17 07/11/17





 06:59 06:59 06:59


 


Intake Total 1038 820 375


 


Output Total 2575 950 


 


Balance -1537 -130 375


 


Weight 84.3 kg 83.2 kg 











General appearance: PRESENT: no acute distress


Eye exam: PRESENT: conjunctiva pink, EOMI, PERRLA


Ear exam: PRESENT: normal external ear exam


Mouth exam: PRESENT: moist, neck supple


Neck exam: ABSENT: lymphadenopathy, meningismus, tenderness, thyromegaly, 

tracheal deviation


Respiratory exam: PRESENT: clear to auscultation venessa.  ABSENT: crackles, rhonchi


Cardiovascular exam: PRESENT: +S1, +S2


GI/Abdominal exam: PRESENT: normal bowel sounds, soft.  ABSENT: organomegaly, 

tenderness


Extremities exam: PRESENT: pedal edema, +1 edema


Neurological exam: PRESENT: alert, awake, oriented to person, oriented to place

, oriented to time


Skin exam: ABSENT: erythema, mottled, rash





Results


Laboratory Results: 


 





 07/09/17 08:10 





 07/09/17 08:10 





 











  07/08/17 07/08/17 07/08/17





  01:27 01:27 07:50


 


Creatine Kinase  69   55


 


CK-MB (CK-2)   1.08 


 


Troponin I   0.016 














  07/08/17 07/08/17 07/08/17





  07:50 14:12 14:12


 


Creatine Kinase   52 L 


 


CK-MB (CK-2)  0.72   0.73


 


Troponin I  0.017   0.013











Impressions: 


 





Chest X-Ray  07/07/17 19:20


IMPRESSION:  Patchy airspace disease in the right lung base.  No significant 

effusion.  Mild interstitial thickening bilaterally.


 














Assessment & Plan





- Diagnosis


(1) CKD (chronic kidney disease) stage 3, GFR 30-59 ml/min


Plan: 


Patient at baseline as far as renal functions are concerned.  I would continue 

on present lines of management while he is being worked up for his cardiac 

conduction abnormalities.








(2) Hypertension


Qualifiers: 


     Hypertension type: unspecified        Qualified Code(s): I10 - Essential (

primary) hypertension  


Is this a current diagnosis for this admission?: YesPlan: 


Relatively stable.  Monitor.








(3) Second degree AV block, Mobitz type I


Is this a current diagnosis for this admission?: YesPlan: 


As per cardiology.








(4) Congestive heart failure


Qualifiers: 


     Congestive heart failure type: unspecified congestive heart failure type  

   Congestive heart failure chronicity: acute        Qualified Code(s): I50.9 - 

Heart failure, unspecified  


Is this a current diagnosis for this admission?: YesPlan: 


Improved with current management.

## 2017-07-11 NOTE — PROGRESS NOTE E
Progress Note



NAME: SULMA MURPHY

MRN: [id number]

: 1944             AGE: 72Y

DATE:  2017          ROOM: 335



SUBJECTIVE:

Of note, the patient was seen prior to and after the stress testing. 

After the stress testing the patient noted distress.



OBJECTIVE:

VITAL SIGNS:  He was afebrile with a temperature of 97.6 degrees

Fahrenheit, his pulse was 76 beats per minute, blood pressure was 149/75,

respirations were 19 per minute, and O2 sats were 90% on room air.



HEENT:  Head is atraumatic, normocephalic.  Eyes - Pupils are equal,

round, regular, reactive to light and accommodation.  Extraocular

movements are normal.  There is no conjunctival pallor.  There is no

scleral icterus.  ENT is negative.



NECK:  Supple.  There is no JVD.  Carotids are equal.  There is no bruit. 

There is no goiter.  Trachea is central.  There is no lymphadenopathy. 

There are no accessory muscles of respiration in use.



LUNGS:  Clear to auscultation and percussion.  There is no chest wall

tenderness.



HEART:  S1 and S2 is heard.  There is no S3 gallop.  There is no S4

gallop.  There is a murmur of mitral regurgitation heard in the apex of

the left ventricle with radiation to the left axilla.  There is no S3 or

S4 gallop.  There is no rub.



ABDOMEN:  Soft, nontender.  There is no hepatosplenomegaly.  Bowel sounds

are well heard.  There are no tender areas of masses.



EXTREMITIES:  Femorals are diminished.  There are no femoral bruits.  Leg

pulses are well felt.  There is no pedal edema.  There is no DVT or

cellulitis.  There is no calf tenderness.



CENTRAL NERVOUS SYSTEM:  The patient is conscious, awake, alert, oriented

x3 with no focal deficits.



PSYCHIATRIC:  The patient's judgement and insight are intact.  His affect

is normal.



DIAGNOSTICS:

The patient's EKG shows sinus bradycardia with second-degree AV block

two-to-one conduction.  Borderline poor R-wave V1 to V3 most likely

secondary to lead placement.  The patient's white count is 7700,

hemoglobin is 12.1, hematocrit is 38.4, and platelet count is 149,000. 

The patient's sodium is 140.3, potassium is 3.5, chloride is 109, CO2 is

21.  The patient's BUN is 22, creatinine is 1.40, GFR now is normal at

greater than 60.  His liver function tests are normal.  His albumin is

3.3, total protein is 6.8.  His NT-proBNP has come down to 597.  Of note,

the patient underwent an exercise treadmill stress Cardiolite this

morning.  Under 2 minutes his heart rate did go up to 103 beats per

minute, but since the target was 85% of maximum predicted heart rate of

129 beats per minute the patient was given 1 mg of atropine while he was

still exercising and he exercised for a total of 4 minutes and 33 seconds

and reached a peak heart rate of 137 beats per minute, which is 92% of

maximum predicted heart rate.  The patient when his heart rate was up went

from second-degree AV block to first-degree AV block.  His blood pressure

response was hypertensive.  His peak blood pressure was 200/99.  There was

1-mm ST-segment depression in leads V5, V6; I am not sure if this is

diagnostic of ischemia.  The Cardiolite images showed that there was no

reversible ischemia and no scar.  This was discussed with the patient.



IMPRESSION:

1.  SECOND-DEGREE AV BLOCK, MOBITZ TYPE 1, WITH STABLE BLOOD PRESSURE AND

ALSO TWO-TO-ONE CONDUCTION MOST LIKELY SECONDARY TO MOBITZ TYPE 1

CONDUCTION PROBLEM.  Note, the patient has no chronotropic incompetence

since the heart rate did go up to 103 with exercise.  As the patient is

asymptomatic, at present would not require temporary or permanent

pacemaker but later would require 30-day event monitor to assure that the

patient does not have any other major problems.

2.  CONGESTIVE HEART FAILURE, AT PRESENT COMPENSATED.  This is most likely

diastolic heart failure and also secondary to volume overload due to

chronic kidney disease.

3.  HYPERTENSION.  Blood pressure is well controlled.

4.  DIABETES MELLITUS, TYPE 2, NONINSULIN DEPENDENT, WITH CHRONIC KIDNEY

DISEASE.

5.  DYSLIPIDEMIA.

6.  CHRONIC KIDNEY DISEASE, AT PRESENT GFR HAS GONE BACK TO NORMAL.

7.  MODERATE MITRAL REGURGITATION.



RECOMMENDATIONS:

Continue current treatment.  The stress test results were discussed with

the patient.  Would continue the patient on aspirin, allopurinol,

atorvastatin, Colace, enalapril, and Lasix 40 mg p.o. daily has been

added.  Continue lisinopril at 40 mg p.o. daily.  Will stop the patient's

Vasotec and continue the patient on lisinopril.



NOTE:

Thirty-five minutes were spent on this patient with more than 50% of the

time spent on direct patient care and also discussing with the patient the

stress test findings and that at present he does not require a temporary

or permanent pacemaker, but he needs to be monitored with a 30-day event

monitor as an outpatient when he does his usual activities to make sure

that there is no other bradycardic problems.  Would also get bilateral

renal artery Dopplers to make sure the patient does not have renal artery

stenosis.  Note:  Highly complex medical decision making required for this

case.  Discussed with the patient and discussed with Dr. Guadalupe. 

Note:  The medications have been reviewed and medications adjusted.  I

will follow with you.







DICTATING PHYSICIAN:  ELLIS DELEON M.D.





[id number]                  DT: 2017    1422

FEDERICO#: [id number]            DD: 2017    1407

ID:   [id number]           JOB#: [id number]       ACCT: [id number]



cc:

>

## 2017-07-11 NOTE — EKG REPORT
SEVERITY:- ABNORMAL ECG -

SINUS BRADYCARDIA

SECOND DEGREE AV BLOCK 2: 1 CONDUCTION.

LEFT ANTERIOR FASCICULAR BLOCK

PROBABLE ANTEROSEPTAL INFARCT, AGE INDETERM

:

Confirmed by: Jose Angel Hallman MD 11-Jul-2017 07:51:42

## 2017-07-11 NOTE — PDOC PROGRESS REPORT
Subjective


Progress Note for:: 07/11/17


Subjective:: 


The patient states to feel much better.  He was able to ambulate without any 

syncopal episodes.


He is presently scheduled for an exercise stress test by the cardiology.





Physical Exam


Vital Signs: 


 











Temp Pulse Resp BP Pulse Ox


 


 98.2 F   39 L  19   153/67 H  100 


 


 07/11/17 07:49  07/11/17 07:49  07/11/17 07:49  07/11/17 07:49  07/11/17 07:49








 Intake & Output











 07/10/17 07/11/17 07/12/17





 06:59 06:59 06:59


 


Intake Total 820 585 


 


Output Total 950  


 


Balance -130 585 


 


Weight 83.2 kg 82.4 kg 











General appearance: PRESENT: no acute distress


Head exam: PRESENT: atraumatic


Eye exam: PRESENT: conjunctiva pink


Neck exam: ABSENT: carotid bruit


Respiratory exam: PRESENT: clear to auscultation venessa


Cardiovascular exam: PRESENT: bradycardia


Pulses: PRESENT: +1 pedal pulses bilateral


Vascular exam: PRESENT: normal capillary refill


GI/Abdominal exam: PRESENT: normal bowel sounds, soft


Extremities exam: PRESENT: full ROM


Musculoskeletal exam: PRESENT: ambulatory


Neurological exam: PRESENT: alert, awake, oriented to time





Results


Laboratory Results: 


 





 07/11/17 04:49 





 07/11/17 04:49 





 











  07/11/17 07/11/17





  04:49 04:49


 


WBC  10.7 H 


 


RBC  4.70 


 


Hgb  12.1 L 


 


Hct  38.4 


 


MCV  82 


 


MCH  25.8 L 


 


MCHC  31.6 L 


 


RDW  17.7 H 


 


Plt Count  149 L 


 


Seg Neutrophils %  69.2 


 


Lymphocytes %  15.1 


 


Monocytes %  11.7 


 


Eosinophils %  3.2 


 


Basophils %  0.8 


 


Absolute Neutrophils  7.4 


 


Absolute Lymphocytes  1.6 


 


Absolute Monocytes  1.3 


 


Absolute Eosinophils  0.3 


 


Absolute Basophils  0.1 


 


Sodium   140.3


 


Potassium   3.5 L


 


Chloride   109 H


 


Carbon Dioxide   21 L


 


Anion Gap   10


 


BUN   22 H


 


Creatinine   1.40 H


 


Est GFR ( Amer)   > 60


 


Est GFR (Non-Af Amer)   50 L


 


Glucose   124 H


 


Calcium   8.6


 


Magnesium   1.8


 


Total Bilirubin   0.6


 


AST   33


 


ALT   44


 


Alkaline Phosphatase   88


 


Total Protein   6.8


 


Albumin   3.3 L








 











  07/08/17 07/08/17 07/08/17





  01:27 01:27 07:50


 


Creatine Kinase  69   55


 


CK-MB (CK-2)   1.08 


 


Troponin I   0.016 


 


NT-Pro-B Natriuret Pep   














  07/08/17 07/08/17 07/08/17





  07:50 14:12 14:12


 


Creatine Kinase   52 L 


 


CK-MB (CK-2)  0.72   0.73


 


Troponin I  0.017   0.013


 


NT-Pro-B Natriuret Pep   














  07/11/17





  04:49


 


Creatine Kinase 


 


CK-MB (CK-2) 


 


Troponin I 


 


NT-Pro-B Natriuret Pep  597











Impressions: 


 





Chest X-Ray  07/07/17 19:20


IMPRESSION:  Patchy airspace disease in the right lung base.  No significant 

effusion.  Mild interstitial thickening bilaterally.


 














Assessment & Plan





- Diagnosis


(1) Second degree AV block, Mobitz type I


Is this a current diagnosis for this admission?: YesPlan: 


Heart rate improved with atropine and exertion.  We will obtain a stress test 

and consider pacemaker if needed








(2) Chronic kidney disease


Qualifiers: 


     Chronic kidney disease stage: stage 2 (mild)


Is this a current diagnosis for this admission?: YesPlan: 


Stable continue current medications








(3) Urinary retention due to benign prostatic hyperplasia


Is this a current diagnosis for this admission?: Yes





(4) Pulmonary edema


Qualifiers: 


     Chronicity: acute     Qualified Code(s): J81.0 - Acute pulmonary edema  


Is this a current diagnosis for this admission?: YesPlan: 


Continue diuretics and check BMP








(5) Congestive heart failure


Qualifiers: 


     Congestive heart failure type: unspecified congestive heart failure type  

   Congestive heart failure chronicity: acute        Qualified Code(s): I50.9 - 

Heart failure, unspecified  


Is this a current diagnosis for this admission?: YesPlan: 


We will continue with diuretics

## 2017-07-12 NOTE — DRAGON STRESS TEST REPORT
Exercise EKG treadmill Cardiolite stress test using SPECT.



Data procedure:   7/11/2017.  Ordering Provider: Dr. Maryann Suarez.   
Primary CARE Physician: Ramses Guadalupe MD.



Patient Status: In Patient.



Indications: Abnormal EKG, congestive heart failure, indication:   and second-
degree AV block,  hypertension . and

also to look for chronotropic incompetence



Note coronary risk factors: Age, and hypertension.



Significant physical findings prior to stress testing show a blood pressure of 
162/76  and a heart rate of 45 Sinus Bradycardia.  Beats per minute.

Auscultation of the heart shows normal S1 and S2.[No] S3 or S4 gallops.  
Systolic murmur in the left sternal border and apex.  Lungs are clear to 
auscultation and percussion.



Resting 12-lead EKG: Second-degree AV block type I Wenckebach.  Left anterior 
vascular block.  



Procedure: The patient was excised on a  standard Ang protocol. .  The 
patient walked a total of 4 minutes and 137 33 seconds on this protocol and 
reached a peak heart rate of  beats per minute, which is 92 % of maximum 
predicted heart rate for age.  This is at a workload of 7 Indications: METS.  
The test was stopped because more than 85% of maximum predicted heart  rate for 
age achieved.  The patient described no symptoms of chest pain/discomfort 
within 2 minutes the patient's heart rate went up to 10 3 bpm, and there is no 
evidence of chronotropic incompetence.  At this time the patient continued to 
exercise and the patient was given 1 mg of atropine intravenously and this with 
this and the patient exercising No evidence of ischemia the heart rate went up 
to 137 as mentioned earlier.

.

Exercise EKG's  This seems to be a 1 mm ST segment depression only in leads V5 
V6, which in my opinion is nondiagnostic of ischemia



Arrhythmias seen: None.



The blood pressure response was hypertensive.  At peak exercise the blood 
pressure was 200 /99 millimeters of Hg. The double product was 27.8 doubt k.



Summary of findings and interpretation:







1.  No chest pain or chest discomfort symptoms reproduced.

2. dOUDT EKG evidence of ischemia in the form of ST segment depression since 
only 1 mm ST segment, depression in leads V5 V6, which could be due to the 
patient's blood pressure being elevated.

3.   Hypertensive blood pressure response.

4.  No arrhythmias seen.

5.  Good exercise tolerance, good aerobic capacity.  Diagnostic treadmill 
stress test most likely negative for ischemia by EKG criteria.





Recommendations:

Correlate with nuclear Cardiolite images.



Nuclear data:



At rest the patient was given 12.8 millicuries of technetium 99 sestamibi, and 
as per protocol rest none gated SPECT images were obtained.



The patient was exercised on a treadmill [see exercise physiology].  One minute 
prior to termination of exercise, 37.0 millicuries of technetium and there 
sestamibi was injected intravenously.  As per protocol stress gated images were 
obtained.

Impression: 



Review of images show that there is liver contamination artifact  inferior 
wall.   In spite of this all segments of the myocardium had normal perfusion at 
rest, and normal perfusion post exercise.  All segments of the myocardium had 
normal motion, contraction, and thickening by gated study.

T. I D. ratio was 0.58.  The computer read rest and stress left ventricular 
ejection fractions w 52 %  and 47 %respectively.  Visually both the ejection 
fractions were normal in excess of 55%.

Conclusions:



1.  No clinical symptoms of exercise-induced myocardial ischemia at a peak 
heart rate of beats per minute, patient having achieved % of maximum predicted 
heart rate for age, at a workload of METS.



2. MoST likely no EKG evidence of exercise-induced myocardial ischemia.  



3.  No arrhythmias seen.   



4.  No scintigraphic evidence of exercise-induced myocardial ischemia.



5. No scintigraphic evidence of myocardial infarction/scar.



6.  No evidence of chronotropic incompetence.



Recommendations

Aggressive coronary risk factor modification, and treatment of underlying 
comorbidities
MTDD

## 2017-07-12 NOTE — PDOC DISCHARGE SUMMARY
General





- Admit/Disc Date/PCP


Admission Date/Primary Care Provider: 


  07/07/17 22:11





  CARLOS JACOB, 





Discharge Date: 07/12/17





- Discharge Diagnosis


(1) Second degree AV block, Mobitz type I


Is this a current diagnosis for this admission?: YesSummary: 


The patient is asymptomatic with his bradycardia at rest.  His heart rate 

increases with atropine and exercise.


His stress test was unremarkable.


Discussed with cardiology.  Will place a 24-48 hour Holter monitor and discuss 

with electrophysiologist.








(2) Chronic kidney disease


Is this a current diagnosis for this admission?: YesSummary: 


Stable continue follow-up with Dr. Scruggs








(3) Urinary retention due to benign prostatic hyperplasia


Is this a current diagnosis for this admission?: Yes





(4) Pulmonary edema


Is this a current diagnosis for this admission?: Yes





(5) Congestive heart failure


Is this a current diagnosis for this admission?: YesSummary: 


Result with some diureses











- Additional Information


Resuscitation Status: Full Code


Discharge Diet: As Tolerated


Discharge Activity: Activity As Tolerated


Home Medications: 








Allopurinol [Zyloprim 300 mg Tablet] 300 mg PO QPM 07/08/17 


Aspirin [Adult Low Dose Aspirin EC] 81 mg PO DAILY 07/08/17 


Atorvastatin Calcium [Lipitor 10 mg Tablet] 10 mg PO DAILY 07/08/17 


Latanoprost [Xalatan 0.005% Oph Soln 2.5 ml] 1 drop OS QHS 07/08/17 


Lisinopril [Prinivil 40 mg Tablet] 40 mg PO DAILY 07/08/17 











History of Present Illness


History of Present Illness: 


SULMA MURPHY is a 72 year old male








Hospital Course


Hospital Course: 


The patient did well during hospitalization.  His heart rate at rest and during 

sleep has remained in the low 40s.  It has increased with exertion and during 

the stress test.  His ultrasound of the kidney did not show any renal artery 

stenosis.


His creatinine has remained stable.  He was seen by cardiology and nephrology.





Physical Exam


Vital Signs: 


 











Temp Pulse Resp BP Pulse Ox


 


 98.8 F   35 L  20   149/66 H  99 


 


 07/12/17 04:18  07/12/17 04:18  07/12/17 04:18  07/12/17 04:18  07/12/17 04:18








 Intake & Output











 07/11/17 07/12/17 07/13/17





 06:59 06:59 06:59


 


Intake Total 585 742 


 


Balance 585 742 


 


Weight 82.4 kg 82.5 kg 











General appearance: PRESENT: no acute distress


Head exam: PRESENT: atraumatic


Eye exam: PRESENT: conjunctiva pink


Neck exam: ABSENT: carotid bruit


Respiratory exam: PRESENT: clear to auscultation venessa


Cardiovascular exam: PRESENT: bradycardia


Pulses: PRESENT: +1 pedal pulses bilateral


Vascular exam: PRESENT: normal capillary refill


GI/Abdominal exam: PRESENT: normal bowel sounds, soft


Extremities exam: PRESENT: full ROM


Musculoskeletal exam: PRESENT: ambulatory


Neurological exam: PRESENT: alert, awake





Results


Laboratory Results: 


 





 07/11/17 04:49 





 07/12/17 04:49 





 











  07/12/17





  04:49


 


Sodium  141.6


 


Potassium  3.6


 


Chloride  108 H


 


Carbon Dioxide  21 L


 


Anion Gap  13


 


BUN  23 H


 


Creatinine  1.56 H


 


Est GFR ( Amer)  53 L


 


Est GFR (Non-Af Amer)  44 L


 


Glucose  115 H


 


Calcium  9.0








 











  07/08/17 07/08/17 07/08/17





  01:27 01:27 07:50


 


Creatine Kinase  69   55


 


CK-MB (CK-2)   1.08 


 


Troponin I   0.016 


 


NT-Pro-B Natriuret Pep   














  07/08/17 07/08/17 07/08/17





  07:50 14:12 14:12


 


Creatine Kinase   52 L 


 


CK-MB (CK-2)  0.72   0.73


 


Troponin I  0.017   0.013


 


NT-Pro-B Natriuret Pep   














  07/11/17





  04:49


 


Creatine Kinase 


 


CK-MB (CK-2) 


 


Troponin I 


 


NT-Pro-B Natriuret Pep  597











Impressions: 


 





Chest X-Ray  07/07/17 19:20


IMPRESSION:  Patchy airspace disease in the right lung base.  No significant 

effusion.  Mild interstitial thickening bilaterally.


 














Plan


Time Spent: Greater than 30 Minutes

## 2017-07-12 NOTE — RADIOLOGY REPORT (SQ)
EXAM DESCRIPTION:  DUPLEX ART/UNRULY FLOW COMPLETE



COMPLETED DATE/TIME:  7/12/2017 5:16 am



REASON FOR STUDY:  SINDHU, CKD.



COMPARISON:  None.



TECHNIQUE:  Grayscale images acquired. Selected color Doppler, velocities and spectral images recorde
d.



LIMITATIONS:  None.



FINDINGS:  RIGHT KIDNEY:

RENAL ARTERY VELOCITIES: 80.4 cm/sec.  Segmental artery velocity 65.2 cm/sec.

RENAL VEIN:  Color Doppler flow present, patent.

VELOCITY RATIO: 0.55.  Normal waveforms.

KIDNEY:  Measures 10.5 cm in length.   No hydronephrosis.  There is a complex cystic area with intern
al septations at the inferior pole of the right kidney measuring 2.9 x 2.3 x 2.8 cm.

LEFT KIDNEY:

RENAL ARTERY VELOCITIES: 76.4 cm/sec.  Segmental artery velocity 65.9 cm/sec.

RENAL VEIN:  Color Doppler flow present, patent.

VELOCITY RATIO: 0.52. Normal waveforms.

KIDNEY:  Measures 10.1 cm in length.   No hydronephrosis.  There is a 1.3 x 1.5 x 1.1 cm cyst at the 
inferior pole of the left kidney.



IMPRESSION:  No Doppler evidence of hemodynamically significant renal artery stenosis.

2.9 cm complex cystic area with internal septations at the inferior pole of the right kidney, cystic 
neoplasm cannot be excluded.  Further evaluation with dedicated CT or MRI recommended.



COMMENT:  NORMAL RENAL ARTERY/AORTA VELOCITY RATIO IS LESS THAN OR EQUAL TO 3.5.



TECHNICAL DOCUMENTATION:  JOB ID:  [id number]

[id number]

 2011 [employer]- All Rights Reserved

## 2017-07-12 NOTE — PROGRESS NOTE E
Progress Note



NAME: SULMA MURPHY

MRN: [id number]

: 1944             AGE: 72Y

DATE: 2017            ROOM: 335



SUBJECTIVE:

The patient denies any chest pain or discomfort.  He continues to be

bradycardic, but with a stable blood pressure.  He denies any PND or

orthopnea.  There is no chest pain or discomfort.  There is no arrhythmia

seen on the monitor, except for the second-degree 2:1 AV conduction,

second-degree AV block.



OBJECTIVE:

GENERAL:  On examination, the patient is in no acute distress.  He is well

groomed.



VITAL SIGNS:  He is afebrile with a temperature of 98.2 degrees

Fahrenheit, pulse is 38 beats per minute, blood pressure was 146/61,

respirations are 19 per minute, O2 sats are 99% on room air.



HEENT:  Head is atraumatic, normocephalic.  Eyes - Pupils are equal,

round, regular, reactive to light and accommodation.  Extraocular

movements are normal.  There is no conjunctival pallor.  There is no

scleral icterus.  ENT is negative.



NECK:  Supple.  There is no JVD.  Carotids are equal.  There is no bruit. 

There is no goiter.  Trachea is central.  There is no lymphadenopathy. 

There are no accessory muscles of respiration in use.



LUNGS:  Clear to auscultation and percussion.  There is no chest wall

tenderness.



HEART:  S1 and S2 is heard.  There is no S3 gallop.  There is no S4

gallop.  There is a murmur of mitral regurgitation heard in the apex of

the left ventricle with radiation to the left axilla.  There is no S3 or

S4 gallop.  There is no rub.



ABDOMEN:  Soft, nontender.  There is no hepatosplenomegaly.  Bowel sounds

are well heard.  There are no tender areas or masses.



EXTREMITIES:  Femorals are diminished.  There are no femoral bruits.  Leg

pulses are well felt.  There is no pedal edema.  There is no DVT or

cellulitis.  There is no cyanosis or clubbing.  There is no calf

tenderness.



CENTRAL NERVOUS SYSTEM:  The patient is conscious, awake, alert, oriented

x3 with no focal deficits.



PSYCHIATRIC:  The patient's judgment and insight are intact.  His affect

is normal.



DIAGNOSTIC DATA:

The patient's ultrasound of the renal arteries showed no evidence of renal

artery stenosis.



The patient's sodium is 141.2, potassium 3.6, chloride 108, CO2 is 29. 

The patient's BUN is 23, creatinine 1.56, GFR is reduced at 53 mL, which

is chronic kidney disease stage 3.  His glucose is 115 and his calcium is

9.0.



ASSESSMENT:

1.   SECOND-DEGREE AV BLOCK, MOBITZ TYPE 1, AND ALSO 2:1 SECOND-DEGREE AV

BLOCK, MOST LIKELY SECONDARY TO MOBITZ TYPE 1 CONDUCTION PROBLEM.  THE

PATIENT HAS NO CHRONOTROPIC INCOMPETENCE BY STRESS TESTING YESTERDAY AND

THERE IS NO ISCHEMIC OR SCAR/MI BY CARDIOLITE IMAGING.  This has been

discussed with the patient.

2.   CONGESTIVE HEART FAILURE, AT PRESENT COMPENSATED.  THIS IS MOST

LIKELY SECONDARY TO DIASTOLIC HEART FAILURE AND ALSO IS SECONDARY TO

VOLUME OVERLOAD DUE TO CHRONIC KIDNEY DISEASE, AT PRESENT COMPENSATED.

3.   HYPERTENSION.  BLOOD PRESSURE IS WELL CONTROLLED.

4.   DIABETES MELLITUS, TYPE 2, NON-INSULIN DEPENDENT, WITH CHRONIC KIDNEY

DISEASE.

5.   DYSLIPIDEMIA.

6.   CHRONIC KIDNEY DISEASE.  AT PRESENT, GFR IS REDUCED AT 53 ML, WHICH

IS CHRONIC KIDNEY DISEASE STAGE 3.

7.   MODERATE MITRAL REGURGITATION.



RECOMMENDATION:

Continue the patient on ACE inhibitor and the current treatment options

and aspirin.  I would not use a beta blocker or an AV jenifer/SA slowing

agent such as Cardizem or diltiazem and would also avoid drugs such as

Catapres, which can cause bradycardia.  The patient is relatively

asymptomatic.  Will get a 30-day event monitor as an outpatient.  We will

also see if the patient can be seen by [facility] Cardiology to make sure that he

does not need a permanent pacemaker, although I feel that the patient is

asymptomatic and does not need it now.  Also, I have asked the patient to

stop his glaucoma medication, which has timolol in it since this beta

blocker can be  a  cause bradycardia and AV block.



Note, 30 minutes were spent on this patient with more than 50% of that

time spent in direct patient care.  I have also discussed with the

patient, his negative renal artery studies for renal artery stenosis. 

There was no evidence of renal artery stenosis by Doppler.  This has been

discussed with the patient in detail.  I discussed with Dr. Guadalupe,

the attending physician on the case.  We will follow the patient up in the

office.  The patient was given my cell number to call me if he has any

problems.  We will get a 30-day event monitor and follow up the patient in

the office.  Will sign off.



Thanking you.





DICTATING PHYSICIAN:  ELLIS DELEON M.D.





[id number]                  DT: [id number]

PHY#: [id number]            DD: [id number]

ID:   [id number]           JOB#: [id number]       ACCT: [id number]



cc:

>







MTDD